# Patient Record
(demographics unavailable — no encounter records)

---

## 2017-03-23 RX ORDER — POTASSIUM CHLORIDE 1500 MG/1
TABLET, EXTENDED RELEASE ORAL
Qty: 15 TABLET | Refills: 0 | OUTPATIENT
Start: 2017-03-23

## 2017-03-23 RX ORDER — POTASSIUM CHLORIDE 20 MEQ/1
20 TABLET, EXTENDED RELEASE ORAL DAILY
Qty: 15 TABLET | Refills: 0 | Status: SHIPPED | OUTPATIENT
Start: 2017-03-23

## 2017-03-23 NOTE — TELEPHONE ENCOUNTER
Pt was last seen in 2/2014. Please have pt schedule follow up/annual appt w/ PCP. No labs since 2014 in system also. Needs to have her potassium checked. I'll fill 15 to [facility] on [address] and defer further refills to Dr. Herrera. Please call and let pt know.

## 2017-03-23 NOTE — LETTER
March 24, 2017    Ila Ambrose  [address] [place] [address]             [facility]  [address]  [place] [address]  Phone: [phone]  Fax: [phone] Dear Mrs. Ambrose:    Your health is important to us.  It has been several years since you last visit with Dr. Devaughn Herrera Jr, MD.  Please make an appointment to come in to touch base on your medical issues.  You can schedule this in [facility], respond to this message with days/times that work best for you or call our office at [phone].    If you have any questions or concerns, please don't hesitate to call.    Sincerely,        Rylee Alonso MA